# Patient Record
Sex: FEMALE | Race: WHITE | NOT HISPANIC OR LATINO | Employment: OTHER | RURAL
[De-identification: names, ages, dates, MRNs, and addresses within clinical notes are randomized per-mention and may not be internally consistent; named-entity substitution may affect disease eponyms.]

---

## 2022-11-03 ENCOUNTER — OFFICE VISIT (OUTPATIENT)
Dept: FAMILY MEDICINE CLINIC | Facility: CLINIC | Age: 82
End: 2022-11-03

## 2022-11-03 VITALS
HEART RATE: 62 BPM | OXYGEN SATURATION: 90 % | WEIGHT: 111.4 LBS | RESPIRATION RATE: 18 BRPM | SYSTOLIC BLOOD PRESSURE: 140 MMHG | DIASTOLIC BLOOD PRESSURE: 80 MMHG

## 2022-11-03 DIAGNOSIS — D51.0 PERNICIOUS ANEMIA: ICD-10-CM

## 2022-11-03 DIAGNOSIS — G20 MODERATE DEMENTIA DUE TO PARKINSON'S DISEASE, WITHOUT BEHAVIORAL DISTURBANCE, PSYCHOTIC DISTURBANCE, MOOD DISTURBANCE, OR ANXIETY: ICD-10-CM

## 2022-11-03 DIAGNOSIS — I10 ESSENTIAL HYPERTENSION, BENIGN: ICD-10-CM

## 2022-11-03 DIAGNOSIS — R73.9 BLOOD GLUCOSE ELEVATED: ICD-10-CM

## 2022-11-03 DIAGNOSIS — E55.9 VITAMIN D INSUFFICIENCY: ICD-10-CM

## 2022-11-03 DIAGNOSIS — E78.2 MIXED HYPERLIPIDEMIA: ICD-10-CM

## 2022-11-03 DIAGNOSIS — H90.3 SENSORINEURAL HEARING LOSS (SNHL) OF BOTH EARS: ICD-10-CM

## 2022-11-03 DIAGNOSIS — Z79.899 ENCOUNTER FOR LONG-TERM (CURRENT) USE OF OTHER MEDICATIONS: ICD-10-CM

## 2022-11-03 DIAGNOSIS — F02.B0 MODERATE DEMENTIA DUE TO PARKINSON'S DISEASE, WITHOUT BEHAVIORAL DISTURBANCE, PSYCHOTIC DISTURBANCE, MOOD DISTURBANCE, OR ANXIETY: ICD-10-CM

## 2022-11-03 DIAGNOSIS — G20 PARKINSON'S DISEASE: Primary | ICD-10-CM

## 2022-11-03 DIAGNOSIS — R53.83 OTHER FATIGUE: ICD-10-CM

## 2022-11-03 DIAGNOSIS — R41.3 MEMORY LOSS: ICD-10-CM

## 2022-11-03 PROCEDURE — 36415 COLL VENOUS BLD VENIPUNCTURE: CPT | Performed by: FAMILY MEDICINE

## 2022-11-03 PROCEDURE — 99214 OFFICE O/P EST MOD 30 MIN: CPT | Performed by: FAMILY MEDICINE

## 2022-11-03 RX ORDER — UBIDECARENONE 50 MG
CAPSULE ORAL DAILY
COMMUNITY

## 2022-11-03 RX ORDER — SOY PROTEIN
POWDER (GRAM) ORAL
COMMUNITY

## 2022-11-03 RX ORDER — SERTRALINE HYDROCHLORIDE 100 MG/1
100 TABLET, FILM COATED ORAL DAILY
Qty: 90 TABLET | Refills: 3 | Status: SHIPPED | OUTPATIENT
Start: 2022-11-03

## 2022-11-03 NOTE — PROGRESS NOTES
"Chief Complaint  Med Sherrie Horan presents to [facility]  History of Present Illness  Patient was brought in by her daughter for a checkup.  Her daughter is very interested in herbal and \"natural\" remedies and treatments, but she is overseeing the patient's care.  The patient has Parkinson's with some severe hearing loss and some evidence of dementia, and the patient could not provide very reliable history.  She really did not have any specific complaints at this time.  They both agree that her mood was stable on the Zoloft.  Her Parkinson's has gotten worse, and she still sees her neurologist regularly for that.  Her appetite is fine, and they report no trouble with bowel or bladder function.  She has to have some type of assistance to walk, and her balance is impaired because of the Parkinson's.  Depression and anxiety are adequately controlled, no thoughts of self-harm.  Objective   Vital Signs:   Vitals:    11/03/22 0926   BP: 140/80   Pulse: 62   Resp: 18   SpO2: 90%   Weight: 50.5 kg (111 lb 6.4 oz)      /80   Pulse 62   Resp 18   Wt 50.5 kg (111 lb 6.4 oz)   SpO2 90%     There is no height or weight on file to calculate BMI.    Review of Systems   Constitutional: Positive for fatigue. Negative for chills, fever and unexpected weight loss.   HENT: Negative for ear discharge, ear pain, mouth sores, nosebleeds, rhinorrhea, sinus pressure, sore throat, swollen glands, trouble swallowing and voice change.    Eyes: Negative for blurred vision, double vision, pain, redness and visual disturbance.   Respiratory: Negative for cough, chest tightness, shortness of breath and wheezing.    Cardiovascular: Negative for chest pain, palpitations and leg swelling.        PND, orthopnea   Gastrointestinal: Negative for abdominal distention, abdominal pain, anal bleeding, blood in stool, constipation, diarrhea, nausea, vomiting and GERD.        Dysphagia, " odynophagia   Endocrine: Negative for polydipsia, polyphagia and polyuria.   Genitourinary: Negative for dysuria, frequency, hematuria, urgency and urinary incontinence.   Musculoskeletal: Positive for gait problem. Negative for arthralgias (unusual/atypica), back pain, joint swelling, myalgias and neck pain.   Skin: Negative for rash, skin lesions (worrisome/suspicious) and bruise.   Allergic/Immunologic: Negative for food allergies.   Neurological: Positive for tremors, weakness (Generalized) and memory problem. Negative for dizziness, seizures, syncope, facial asymmetry, light-headedness, numbness and headache.   Hematological: Negative for adenopathy. Does not bruise/bleed easily.   Psychiatric/Behavioral: Negative for self-injury, sleep disturbance, suicidal ideas and depressed mood. The patient is not nervous/anxious.        Past History:  Medical History: has a past medical history of Anxiety, B12 deficiency (2016), Chest pain, Depression, Essential hypertension, GERD (gastroesophageal reflux disease), Hypercholesterolemia, Hyperlipidemia, Hypertensive disorder, Memory loss (2017), Osteopenia (2016), Parkinson's disease (HCC) (2017), Positive MARTHA (antinuclear antibody) (2017), and Severe hearing loss.   Surgical History: has a past surgical history that includes Inner ear surgery (1981) and Hysterectomy (1978).   Family History: family history includes Breast cancer in her sister; Cerebral aneurysm in her sister; Coronary artery disease in an other family member; Hearing loss in her father; Hyperlipidemia in her mother; Hypertension in her mother; Kidney disease in her father; Parkinsonism in her brother; Skin cancer in her father.   Social History: reports that she has never smoked. She has never used smokeless tobacco. She reports that she does not drink alcohol and does not use drugs.      Current Outpatient Medications:   •  carbidopa-levodopa (SINEMET)  MG per tablet, Every 6 (Six) Hours., Disp: ,  Rfl:   •  Cobalamin Combinations (Vitamin B12-Folic Acid) 500-400 MCG tablet, vitamin B12 0.5 mg-folic acid 1 mg tablet  Take 1 tablet every day by oral route., Disp: , Rfl:   •  coenzyme Q10 50 MG capsule capsule, Take  by mouth Daily., Disp: , Rfl:   •  vitamin D3 125 MCG (5000 UT) capsule capsule, Take 1 capsule by mouth Daily., Disp: , Rfl:   •  sertraline (Zoloft) 100 MG tablet, Take 1 tablet by mouth Daily., Disp: 90 tablet, Rfl: 3    Allergies: Patient has no known allergies.    Physical Exam  Constitutional:       General: She is not in acute distress.     Appearance: She is normal weight. She is not ill-appearing or toxic-appearing.   HENT:      Head: Normocephalic and atraumatic.      Right Ear: Ear canal and external ear normal.      Left Ear: Ear canal and external ear normal.      Nose: Nose normal.      Mouth/Throat:      Mouth: Mucous membranes are moist.      Pharynx: Oropharynx is clear.   Eyes:      Extraocular Movements: Extraocular movements intact.      Conjunctiva/sclera: Conjunctivae normal.      Pupils: Pupils are equal, round, and reactive to light.   Cardiovascular:      Rate and Rhythm: Normal rate and regular rhythm.      Pulses: Normal pulses.      Heart sounds: Normal heart sounds.   Pulmonary:      Effort: Pulmonary effort is normal.      Breath sounds: Normal breath sounds.   Abdominal:      General: Abdomen is flat. Bowel sounds are normal. There is no distension.      Palpations: Abdomen is soft. There is no mass.      Tenderness: There is no abdominal tenderness.   Musculoskeletal:         General: No tenderness. Normal range of motion.      Cervical back: Normal range of motion and neck supple. No tenderness.   Skin:     General: Skin is warm and dry.      Capillary Refill: Capillary refill takes less than 2 seconds.   Neurological:      Mental Status: She is alert.      Cranial Nerves: No dysarthria or facial asymmetry.      Sensory: Sensation is intact.      Motor: Tremor  "present.      Gait: Gait abnormal.      Comments: Patient exhibits significant bradykinesia, mild generalized weakness, masklike face with limited expression, and some tremor at rest.  She has difficulty getting up from a chair without assistance and walks holding onto her daughter's arm in a very slow pace.  Shuffling.  Oriented to person and place only   Psychiatric:         Attention and Perception: Attention and perception normal.         Mood and Affect: Mood and affect normal.         Speech: Speech is delayed and tangential.         Behavior: Behavior normal. Behavior is cooperative.         Thought Content: Thought content is not paranoid. Thought content does not include homicidal or suicidal ideation.         Cognition and Memory: Memory is impaired.         Judgment: Judgment normal.      Comments: Patient seemed a bit more confused today, had difficulty understanding what she was talking about at times, but in no acute distress.  She was pleasant and smiling throughout the exam.  She is very hard of hearing, making assessment of her cognition difficult                   Assessment and Plan   Diagnoses and all orders for this visit:    1. Parkinson's disease (HCC) (Primary)  This is being managed by neurologist, and unfortunately has obviously progressed.  I will refill her sertraline, and we will check labs.  2. Essential hypertension, benign  Patient has not needed treatment for the hypertension in a while, and blood pressure has been fine lately.  3. Encounter for long-term (current) use of other medications  -     CBC & Differential; Future  -     Comprehensive Metabolic Panel; Future  -     CBC & Differential  -     Comprehensive Metabolic Panel    4. Memory loss  Patient's memory impairment seems more pronounced today, most likely Parkinson's dementia.  I encouraged the daughter who is helping to take care of her to limit her use of herbal and \"natural\" remedies, as these have uncertain actions and " all patients, especially the elderly, and unknown drug interactions.  5. Pernicious anemia  -     Vitamin B12; Future  -     Folate; Future  -     Vitamin B12  -     Folate    6. Other fatigue  -     TSH+Free T4; Future  -     TSH+Free T4    7. Mixed hyperlipidemia  -     Lipid Panel; Future  -     Lipid Panel    8. Blood glucose elevated  -     Hemoglobin A1c; Future  -     Hemoglobin A1c    9. Vitamin D insufficiency  -     Vitamin D,25-Hydroxy; Future  -     Vitamin D,25-Hydroxy    10. Sensorineural hearing loss (SNHL) of both ears    11. Moderate dementia due to Parkinson's disease, without behavioral disturbance, psychotic disturbance, mood disturbance, or anxiety (HCC)  Did not exhibit any psychotic symptoms while here today, unfortunately there is little we can offer her an they do not want more medicine.  If it does become apparent that she needs some treatment for this stable contact her neurologist.  If all goes well I will see her back in 1 year or sooner if needed  Other orders  -     sertraline (Zoloft) 100 MG tablet; Take 1 tablet by mouth Daily.  Dispense: 90 tablet; Refill: 3            Follow Up   Return in about 1 year (around 11/3/2023) for Recheck.  Patient was given instructions and counseling regarding her condition or for health maintenance advice. Please see specific information pulled into the AVS if appropriate.     Hardeep Sheffield MD

## 2022-11-04 LAB
25(OH)D3+25(OH)D2 SERPL-MCNC: 17.2 NG/ML (ref 30–100)
ALBUMIN SERPL-MCNC: 4.4 G/DL (ref 3.6–4.6)
ALBUMIN/GLOB SERPL: 1.6 {RATIO} (ref 1.2–2.2)
ALP SERPL-CCNC: 114 IU/L (ref 44–121)
ALT SERPL-CCNC: 7 IU/L (ref 0–32)
AST SERPL-CCNC: 11 IU/L (ref 0–40)
BASOPHILS # BLD AUTO: 0 X10E3/UL (ref 0–0.2)
BASOPHILS NFR BLD AUTO: 1 %
BILIRUB SERPL-MCNC: 0.3 MG/DL (ref 0–1.2)
BUN SERPL-MCNC: 10 MG/DL (ref 8–27)
BUN/CREAT SERPL: 17 (ref 12–28)
CALCIUM SERPL-MCNC: 10.6 MG/DL (ref 8.7–10.3)
CHLORIDE SERPL-SCNC: 104 MMOL/L (ref 96–106)
CHOLEST SERPL-MCNC: 293 MG/DL (ref 100–199)
CO2 SERPL-SCNC: 26 MMOL/L (ref 20–29)
CREAT SERPL-MCNC: 0.6 MG/DL (ref 0.57–1)
EGFRCR SERPLBLD CKD-EPI 2021: 90 ML/MIN/1.73
EOSINOPHIL # BLD AUTO: 0.1 X10E3/UL (ref 0–0.4)
EOSINOPHIL NFR BLD AUTO: 2 %
ERYTHROCYTE [DISTWIDTH] IN BLOOD BY AUTOMATED COUNT: 12.1 % (ref 11.7–15.4)
FOLATE SERPL-MCNC: 3.5 NG/ML
GLOBULIN SER CALC-MCNC: 2.7 G/DL (ref 1.5–4.5)
GLUCOSE SERPL-MCNC: 90 MG/DL (ref 70–99)
HBA1C MFR BLD: 5.4 % (ref 4.8–5.6)
HCT VFR BLD AUTO: 38.8 % (ref 34–46.6)
HDLC SERPL-MCNC: 37 MG/DL
HGB BLD-MCNC: 12.9 G/DL (ref 11.1–15.9)
IMM GRANULOCYTES # BLD AUTO: 0 X10E3/UL (ref 0–0.1)
IMM GRANULOCYTES NFR BLD AUTO: 0 %
LABORATORY COMMENT REPORT: ABNORMAL
LDLC SERPL CALC-MCNC: 229 MG/DL (ref 0–99)
LYMPHOCYTES # BLD AUTO: 1.1 X10E3/UL (ref 0.7–3.1)
LYMPHOCYTES NFR BLD AUTO: 16 %
MCH RBC QN AUTO: 31.9 PG (ref 26.6–33)
MCHC RBC AUTO-ENTMCNC: 33.2 G/DL (ref 31.5–35.7)
MCV RBC AUTO: 96 FL (ref 79–97)
MONOCYTES # BLD AUTO: 0.5 X10E3/UL (ref 0.1–0.9)
MONOCYTES NFR BLD AUTO: 8 %
NEUTROPHILS # BLD AUTO: 5.1 X10E3/UL (ref 1.4–7)
NEUTROPHILS NFR BLD AUTO: 73 %
PLATELET # BLD AUTO: 326 X10E3/UL (ref 150–450)
POTASSIUM SERPL-SCNC: 4.9 MMOL/L (ref 3.5–5.2)
PROT SERPL-MCNC: 7.1 G/DL (ref 6–8.5)
RBC # BLD AUTO: 4.04 X10E6/UL (ref 3.77–5.28)
SODIUM SERPL-SCNC: 143 MMOL/L (ref 134–144)
T4 FREE SERPL-MCNC: 1 NG/DL (ref 0.82–1.77)
TRIGL SERPL-MCNC: 144 MG/DL (ref 0–149)
TSH SERPL DL<=0.005 MIU/L-ACNC: 2.68 UIU/ML (ref 0.45–4.5)
VIT B12 SERPL-MCNC: >2000 PG/ML (ref 232–1245)
VLDLC SERPL CALC-MCNC: 27 MG/DL (ref 5–40)
WBC # BLD AUTO: 6.8 X10E3/UL (ref 3.4–10.8)

## 2023-03-01 ENCOUNTER — TELEPHONE (OUTPATIENT)
Dept: FAMILY MEDICINE CLINIC | Facility: CLINIC | Age: 83
End: 2023-03-01
Payer: MEDICARE

## 2023-03-01 NOTE — TELEPHONE ENCOUNTER
Ana with  Hospice called wanting to know if Dr Sheffield will follow if pt is admitted to hospice care. Also wants him to verbally certify that pt has a terminal illness of 6 months or less. #[phone] option 2

## 2023-03-03 NOTE — TELEPHONE ENCOUNTER
Spoke with Zaira with hospice. She said patient has been declining and patient's daughter called 03/01/2023 to have her admitted to Hospice.